# Patient Record
Sex: MALE | Race: WHITE | NOT HISPANIC OR LATINO | URBAN - METROPOLITAN AREA
[De-identification: names, ages, dates, MRNs, and addresses within clinical notes are randomized per-mention and may not be internally consistent; named-entity substitution may affect disease eponyms.]

---

## 2019-07-08 ENCOUNTER — COMMUNICATION - HEALTHEAST (OUTPATIENT)
Dept: SCHEDULING | Facility: CLINIC | Age: 69
End: 2019-07-08

## 2019-07-08 ENCOUNTER — OFFICE VISIT - HEALTHEAST (OUTPATIENT)
Dept: UROLOGY | Facility: CLINIC | Age: 69
End: 2019-07-08

## 2019-07-08 DIAGNOSIS — N13.2 HYDRONEPHROSIS WITH URINARY OBSTRUCTION DUE TO URETERAL CALCULUS: ICD-10-CM

## 2019-07-08 DIAGNOSIS — N20.0 CALCULUS OF KIDNEY: ICD-10-CM

## 2019-07-08 DIAGNOSIS — N20.1 CALCULUS OF URETER: ICD-10-CM

## 2019-07-08 LAB
ALBUMIN UR-MCNC: ABNORMAL MG/DL
APPEARANCE UR: ABNORMAL
BILIRUB UR QL STRIP: NEGATIVE
COLOR UR AUTO: YELLOW
GLUCOSE UR STRIP-MCNC: NEGATIVE MG/DL
HGB UR QL STRIP: ABNORMAL
KETONES UR STRIP-MCNC: ABNORMAL MG/DL
LEUKOCYTE ESTERASE UR QL STRIP: ABNORMAL
NITRATE UR QL: NEGATIVE
PH UR STRIP: 5.5 [PH] (ref 5–8)
SP GR UR STRIP: 1.02 (ref 1–1.03)
UROBILINOGEN UR STRIP-ACNC: ABNORMAL

## 2019-07-08 RX ORDER — ERGOCALCIFEROL (VITAMIN D2) 10 MCG
400 TABLET ORAL
Status: SHIPPED | COMMUNITY
Start: 2019-07-08

## 2019-07-08 RX ORDER — LOSARTAN POTASSIUM 25 MG/1
TABLET ORAL
Refills: 3 | Status: SHIPPED | COMMUNITY
Start: 2019-04-11

## 2019-07-08 RX ORDER — NITROGLYCERIN 0.4 MG/1
0.4 TABLET SUBLINGUAL
Status: SHIPPED | COMMUNITY
Start: 2017-07-05

## 2019-07-08 RX ORDER — CLOPIDOGREL BISULFATE 75 MG/1
75 TABLET ORAL DAILY
Refills: 3 | Status: SHIPPED | COMMUNITY
Start: 2018-12-28

## 2019-07-08 RX ORDER — ATORVASTATIN CALCIUM 80 MG/1
80 TABLET, FILM COATED ORAL
Status: SHIPPED | COMMUNITY
Start: 2017-07-05

## 2019-07-08 RX ORDER — SILDENAFIL CITRATE 20 MG/1
TABLET ORAL
Status: SHIPPED | COMMUNITY
Start: 2018-03-01

## 2019-07-10 ENCOUNTER — COMMUNICATION - HEALTHEAST (OUTPATIENT)
Dept: UROLOGY | Facility: CLINIC | Age: 69
End: 2019-07-10

## 2019-07-10 ENCOUNTER — AMBULATORY - HEALTHEAST (OUTPATIENT)
Dept: UROLOGY | Facility: CLINIC | Age: 69
End: 2019-07-10

## 2019-07-10 DIAGNOSIS — N20.1 CALCULUS OF URETER: ICD-10-CM

## 2019-07-11 ENCOUNTER — ANESTHESIA - HEALTHEAST (OUTPATIENT)
Dept: SURGERY | Facility: CLINIC | Age: 69
End: 2019-07-11

## 2019-07-12 ENCOUNTER — SURGERY - HEALTHEAST (OUTPATIENT)
Dept: SURGERY | Facility: CLINIC | Age: 69
End: 2019-07-12

## 2019-07-12 ASSESSMENT — MIFFLIN-ST. JEOR: SCORE: 1517.42

## 2019-07-13 ENCOUNTER — COMMUNICATION - HEALTHEAST (OUTPATIENT)
Dept: SCHEDULING | Facility: CLINIC | Age: 69
End: 2019-07-13

## 2019-07-15 ENCOUNTER — AMBULATORY - HEALTHEAST (OUTPATIENT)
Dept: UROLOGY | Facility: CLINIC | Age: 69
End: 2019-07-15

## 2019-07-15 DIAGNOSIS — R33.9 RETENTION OF URINE, UNSPECIFIED: ICD-10-CM

## 2019-07-15 DIAGNOSIS — N20.1 CALCULUS OF URETER: ICD-10-CM

## 2019-07-17 ENCOUNTER — COMMUNICATION - HEALTHEAST (OUTPATIENT)
Dept: SCHEDULING | Facility: CLINIC | Age: 69
End: 2019-07-17

## 2019-07-18 ENCOUNTER — COMMUNICATION - HEALTHEAST (OUTPATIENT)
Dept: SCHEDULING | Facility: CLINIC | Age: 69
End: 2019-07-18

## 2019-07-18 ENCOUNTER — AMBULATORY - HEALTHEAST (OUTPATIENT)
Dept: UROLOGY | Facility: CLINIC | Age: 69
End: 2019-07-18

## 2019-07-18 DIAGNOSIS — N20.1 CALCULUS OF URETER: ICD-10-CM

## 2019-07-22 ENCOUNTER — AMBULATORY - HEALTHEAST (OUTPATIENT)
Dept: UROLOGY | Facility: CLINIC | Age: 69
End: 2019-07-22

## 2019-07-22 DIAGNOSIS — R33.9 RETENTION OF URINE, UNSPECIFIED: ICD-10-CM

## 2019-07-22 RX ORDER — TAMSULOSIN HYDROCHLORIDE 0.4 MG/1
CAPSULE ORAL
Qty: 30 CAPSULE | Refills: 2 | Status: SHIPPED | OUTPATIENT
Start: 2019-07-22

## 2019-08-12 ENCOUNTER — OFFICE VISIT - HEALTHEAST (OUTPATIENT)
Dept: UROLOGY | Facility: CLINIC | Age: 69
End: 2019-08-12

## 2019-08-12 DIAGNOSIS — N20.1 CALCULUS OF URETER: ICD-10-CM

## 2019-08-12 DIAGNOSIS — N20.0 CALCULUS OF KIDNEY: ICD-10-CM

## 2019-08-12 LAB
ALBUMIN UR-MCNC: ABNORMAL MG/DL
APPEARANCE UR: ABNORMAL
BILIRUB UR QL STRIP: NEGATIVE
COLOR UR AUTO: YELLOW
GLUCOSE UR STRIP-MCNC: NEGATIVE MG/DL
HGB UR QL STRIP: ABNORMAL
KETONES UR STRIP-MCNC: NEGATIVE MG/DL
LEUKOCYTE ESTERASE UR QL STRIP: ABNORMAL
NITRATE UR QL: NEGATIVE
PH UR STRIP: 5.5 [PH] (ref 5–8)
SP GR UR STRIP: 1.02 (ref 1–1.03)
UROBILINOGEN UR STRIP-ACNC: ABNORMAL

## 2021-05-30 NOTE — TELEPHONE ENCOUNTER
Pt's significant other Melani reports pt has been taking Tylenol, Ibuprofen and oxycodone and still having severe pain trying to pass a kidney stone. Also having nausea.    Reviewed pain management with Melani per [facility] protocols. Advised Melani to have pt take ondansetron for nausea as he has not taken this yet. Advised Melani to encourage pt to try to relax. If pain uncontrollable should return to ER.     Melani verbalizes understanding and agrees to plan.     Reason for Disposition    [1] SEVERE pain (e.g., excruciating, scale 8-10) AND [2] not improved after pain medicine    Protocols used: FLANK PAIN-A-AH

## 2021-05-30 NOTE — ANESTHESIA CARE TRANSFER NOTE
Last vitals:   Vitals:    07/12/19 1128   BP: 112/61   Pulse: 66   Resp: 12   Temp: 36.8  C (98.3  F)   SpO2: 93%     Patient's level of consciousness is drowsy  Spontaneous respirations: yes  Maintains airway independently: yes  Dentition unchanged: yes  Oropharynx: oropharynx clear of all foreign objects    QCDR Measures:  ASA# 20 - Surgical Safety Checklist: WHO surgical safety checklist completed prior to induction    PQRS# 430 - Adult PONV Prevention: 4558F - Pt received => 2 anti-emetic agents (different classes) preop & intraop  ASA# 8 - Peds PONV Prevention: NA - Not pediatric patient, not GA or 2 or more risk factors NOT present  PQRS# 424 - Eugenia-op Temp Management: 4559F - At least one body temp DOCUMENTED => 35.5C or 95.9F within required timeframe  PQRS# 426 - PACU Transfer Protocol: - Transfer of care checklist used  ASA# 14 - Acute Post-op Pain: ASA14B - Patient did NOT experience pain >= 7 out of 10

## 2021-05-30 NOTE — PROGRESS NOTES
Patient presents to the office today for a trial of void.  He recently went into the ER for urinary rentention after stent removal and trial of void last week.  He is here to try again.    Patient's bladder was filled to 225ml of sterile water via villasenor catheter.  He has a strong urge to urinate.  Villasenor catheter was removed without difficulty.  Patient was able to urinate 75ml.  Per Dr. Sanchez, patient may leave office and report to [facility] in the early afternoon to see if he is able to have a normal urination or not.  Will determine if patient needs a villasenor catheter again.  Patient is in agreement with plan.  He will call in the afternoon.      Patient returned to office at 3:30pm.  Bladder scan was obtained for 363ml.  Patient was taught self catheterization without difficulty.  He was able to drain out 325ml.  He will cath himself 4x daily and log his progress.  Patient is in agreement with recommendations.

## 2021-05-30 NOTE — PROGRESS NOTES
Patient presents to the office today for a trial of void and cystoscopy left ureteral stent removal procedure.    Fill & pull education discussed with patient with understanding.  Mckee catheter was removed without difficulty.    Patient educated regarding stent removal procedure and possible symptoms after removal.  Patient voiced understanding of information.  Handout given to patient.  Consent form signed.    [facility] Timeout    Correct patient?: Yes  Correct site?:  Yes  Correct procedure?:  Yes  Correct laterality?:  Left  Consents verified?:  Yes  Relevant lab results available?:  Yes     200ml of sterile water was instilled into bladder via cystoscopy during stent removal.  Patient was able to void out 100ml.

## 2021-05-30 NOTE — PROGRESS NOTES
Assessment/Plan:        Diagnoses and all orders for this visit:    Retention of urine, unspecified  -     tamsulosin (FLOMAX) 0.4 mg cap; 1 tablet (0.4 mg) daily with food  Dispense: 30 capsule; Refill: 2      Stone Management Plan  [facility] Stone Management 7/15/2019 7/18/2019 7/22/2019   Urinary Tract Infection No suspicion of infection No suspicion of infection No suspicion of infection   Renal Colic Well controlled symptoms Well controlled symptoms Well controlled symptoms   Renal Failure No suspicion of renal failure No suspicion of renal failure No suspicion of renal failure   Current CT date - - -   Right sided stones? - - -   R Number of ureteral stones - - -   R Number of kidney stones  - - -   R GSD of kidney stones - - -   R Hydronephrosis - - -   R Stone Event No current event No current event No current event   R Current Plan - - -   Observe rationale - - -   Left sided stones? - - -   L Number of ureteral stones - - -   L GSD of ureteral stones - - -   L Location of ureteral stone - - -   L Number of kidney stones  - - -   L GSD of kidney stones - - -   L Hydronephrosis - - -   L Stone Event Established event Established event Resolved event   Diagnosis date - - -   Initial location of primary symptomatic stone - - -   Initial GSD of primary symptomatic stone - - -   Post-op status Unanticipated Clinic Visit Stent Removal -   L Current Plan - - -   MET - - -             Subjective:      HPI  Mr. Oliver Thomas is a 69 y.o.  male returning to the [facility] [place] for follow up of post op urinary retention. He has toleratted catheter relatively well. He has had two episodes of retention requiring ED care. He has been on Flomax in the past in an inconsistent fashion.    Today in clinic, he has equivocal response to trial of void.     He returns several hours later and is not confident that he is emptying. Bladder scan reveals ~300 cc in the bladder.    He is instructed in self  catheterization. He is given a prescription for Flomax.    He has an established relationship with a general urologist who has been looking after his prostate. Encouraged to follow with that physician.    Was give a prescription for Flomax.     ROS   Review of systems is negative except for HPI.    Past Medical History:   Diagnosis Date     Kidney stone      Myocardial infarction (H)      Ureterolithiasis        Past Surgical History:   Procedure Laterality Date     CORONARY ANGIOPLASTY WITH STENT PLACEMENT       CYSTOSCOPY W/ URETERAL STENT PLACEMENT Left 7/12/2019    Procedure: CYSTOSCOPY LEFT URETEROSCOPIC STONE EXTRACTION STENT INSERTION;  Surgeon: Brooks Sanchez MD;  Location: [facility];  Service: Urology       Current Outpatient Medications   Medication Sig Dispense Refill     aspirin 81 MG EC tablet Take 81 mg by mouth.       atorvastatin (LIPITOR) 80 MG tablet Take 80 mg by mouth.       clopidogrel (PLAVIX) 75 mg tablet Take 75 mg by mouth daily.  3     ergocalciferol, vitamin D2, 400 unit Tab Take 400 Units by mouth.       losartan (COZAAR) 25 MG tablet TK 1/2 T PO D  3     multivitamin with minerals (SUPER THERA JULIAN M) tablet Take 1 tablet by mouth.       nitroglycerin (NITROSTAT) 0.4 MG SL tablet Place 0.4 mg under the tongue.       oxyCODONE (ROXICODONE) 5 MG immediate release tablet Take 1-2 tablets (5-10 mg total) by mouth every 4 (four) hours as needed for pain. 15 tablet 0     sildenafil (REVATIO) 20 mg tablet TK 1/2 TO 1 PRN ONE HOUR BEFORE INTERCOURSE. NO MORE THAN 1 DOSE IN 24 HOURS       tamsulosin (FLOMAX) 0.4 mg cap Take 0.4 mg by mouth.       tamsulosin (FLOMAX) 0.4 mg cap 1 tablet (0.4 mg) daily with food 30 capsule 2     No current facility-administered medications for this visit.        No Known Allergies    Social History     Socioeconomic History     Marital status:      Spouse name: Not on file     Number of children: Not on file     Years of education: Not on file      Highest education level: Not on file   Occupational History     Occupation: Contractor   Social Needs     Financial resource strain: Not on file     Food insecurity:     Worry: Not on file     Inability: Not on file     Transportation needs:     Medical: Not on file     Non-medical: Not on file   Tobacco Use     Smoking status: Former Smoker     Last attempt to quit: 2017     Years since quittin.0     Smokeless tobacco: Never Used   Substance and Sexual Activity     Alcohol use: Yes     Comment: occas     Drug use: Not on file     Sexual activity: Not on file   Lifestyle     Physical activity:     Days per week: Not on file     Minutes per session: Not on file     Stress: Not on file   Relationships     Social connections:     Talks on phone: Not on file     Gets together: Not on file     Attends Sabianism service: Not on file     Active member of club or organization: Not on file     Attends meetings of clubs or organizations: Not on file     Relationship status: Not on file     Intimate partner violence:     Fear of current or ex partner: Not on file     Emotionally abused: Not on file     Physically abused: Not on file     Forced sexual activity: Not on file   Other Topics Concern     Not on file   Social History Narrative     Not on file       Family History   Problem Relation Age of Onset     Diabetes Mother      Urolithiasis Father      Urolithiasis Sister      Diabetes Sister      Heart disease Sister      Prostate cancer Brother      Diabetes Maternal Grandmother      Clotting disorder Neg Hx      Gout Neg Hx      Objective:      Physical Exam  Vitals:    19 1001   BP: (!) 163/96   Pulse: 69   Temp: 98  F (36.7  C)     General - well developed, well nourished, appropriate for age. Appears no distress at this time  Abdomen - moderately obese soft, non-tender, no hepatosplenomegaly, no masses.   - no flank tenderness, no suprapubic tenderness, kidney and bladder non-palpable  MSK - normal  spinal curvature. no spinal tenderness. normal gait. muscular strength intact.  Psych - oriented to time, place, and person, normal mood and affect.      Labs   Urinalysis POC (Office):  Nitrite, UA   Date Value Ref Range Status   07/18/2019 Negative Negative Final   07/13/2019 Negative Negative Final   07/08/2019 Negative Negative Final       Lab Urinalysis:  Blood, UA   Date Value Ref Range Status   07/18/2019 Moderate (!) Negative Final   07/13/2019 Large (!) Negative Final   07/08/2019 Trace (!) Negative Final     Nitrite, UA   Date Value Ref Range Status   07/18/2019 Negative Negative Final   07/13/2019 Negative Negative Final   07/08/2019 Negative Negative Final     Leukocytes, UA   Date Value Ref Range Status   07/18/2019 Moderate (!) Negative Final   07/13/2019 Moderate (!) Negative Final   07/08/2019 Small (!) Negative Final     pH, UA   Date Value Ref Range Status   07/18/2019 5.5 4.5 - 8.0 Final   07/13/2019 6.5 4.5 - 8.0 Final   07/08/2019 5.5 5.0 - 8.0 Final

## 2021-05-30 NOTE — TELEPHONE ENCOUNTER
"[facility] Dr Sanchez stent removal and villasenor catheter removed. He urinated while in office ~1/2= 3/4 cup 9am.   Home ~10am. He has urinated a little bit of blood clotting and a few drops. He has had a catheter x 2+1/2 days. He has been drinking fluids (6 cups).   He had urge to urinate when he tries to pass urine, but nothing is coming out.   He feels like his bladder is 3/4 the way full.  Pain=\"1-2\". His left flank pain is gone now. He took Tylenol before the procedure. He is considering taking Ibuprofen now.   He does not feel a strong urge to urinate.   He has had a couple of small bowel movements.   No fever. He has tried a warm bath already.  He has taken 2 Tamsulosin today. (spaced 4 hrs apart, last taken @ 3pm).     On call Dr Sanchez contacted @ 7:15 pm. If he does not urinate within the next 1-2 hrs, then he should be seen in the ER. Contacted patient with this information.     Madhavi Durán RN [facility] Triage Nurse  "

## 2021-05-30 NOTE — TELEPHONE ENCOUNTER
Scheduled with [facility] Dr Sanchez @ [facility] for stent removal tomorrow am 8:20am. He has a catheter since last Saturday. He has a very small amount of bleeding @ meatus and urine is pinkish--he wanted to know if this was normal? (yes). He verified time of his appointment in am as well.    Madhavi Durán RN [facility] Triage Nurse

## 2021-05-30 NOTE — PATIENT INSTRUCTIONS - HE
Patient Stated Goal: Pass my stone  Symptom Control While Passing A Stone    The goal of [facility] is to let a smaller kidney stone (less than 4 to 5 mm) pass without intervention if possible. Giving your body a chance to clear the stone may take a few hours up to a few weeks.  Keeping you well-informed, safe and fairly comfortable is important.    Drink to thirst  Do not attempt to  flush out  your stone by drinking too much fluid. This does not work and may increase nausea. Drink enough to satisfy your body s thirst. Eating your normal diet is fine.   Medications (that may be suggested or prescribed)    Ibuprofen (Advil or Motrin) Available over the counter  o Take two (200mg) tablets every six hours until the stone passes.  o Prevents spasm of the ureter.    o Decreases pain.      Dramamine* (drowsy version, non-generic formulation) Available over the counter  o Take 50mg at bedtime  o Decreases spasms of the ureter  o Decreases nausea  o Decreases acute pain  o Decreases recurrence of pain for 24 hours  o Will help you sleep  *This medication will cause increase drowsiness, do not drive or operate machinery for 6 hours.      Narcotics (Percocet, Vicodin, Dilaudid) Take as prescribed for severe pain unrelieved by ibuprofen and Dramamine  o Narcotics have significant side effects and only  cover-up  pain. They have no effect on the cause of pain.  o Common side effects  - Confusion, disorientation and sedation - DO NOT DRIVE OR OPERATE MACHINERY WITHIN 24 HOURS  - Nausea - take Dramamine or Zofran or Haldol to help control  - Constipation  - Sleep disturbances      Ondansetron (Zofran) Take as prescribed  o Reserve for severe nausea  o May cause constipation, start over the counter Miralax if needed      Second Line Anti-Nausea Medication: Adding a different anti-nausea medication maybe helpful for persistent nausea.  The combined effect of different types of anti-nausea medications maybe more  effective than either medication by itself, even in higher doses.  o Compazine: Take as prescribed      Information about kidney stones    Crystals can form if chemicals are too concentrated in your urine. If the crystal grows over time, a stone may form. A stone usually isn t painful while it is still in the kidney.    As the stone begins to leave the kidney, you may experience episodes of flank pain as the kidney stone approaches the entrance to the ureter. Some people feel a vague ache in the side.    Kidney stones may fall into the ureter. Some stones are tiny and pass through without causing symptoms. The ureter is a small tube (approximately 1/8 of an inch wide). A kidney stone can get stuck and block the ureter. If this happens, urine backs up and flows back to the kidney. Back pressure on the kidney can cause:  o Severe flank pain radiating to the groin.  o Severe nausea and vomiting.  o The pain can occur in the lower back, side, groin or all three.      When the stone reaches the lower ureter, this can irritate the bladder and sensations of feeling the urge to urinate frequently and urgently may occur.      Once the stone passes out of your ureter and into your bladder, the symptoms of urgency and frequency will often disappear. Sometimes pain will come back for a short period and will not be as severe as before. The passage of the stone from your bladder and out of your body is usually not a problem. The urethra is at least twice as wide as the normal ureter, so the stone doesn t usually block it.    Strain all urine  If you pass the stone, save it. Place it in the container we have provided and bring it to the [facility] within a week of passing it. Your stone will then be sent for analysis which takes about a month.     Signs and symptoms you might experience    Nausea    Decreased appetite    Urinary frequency    Bloody urine     Chills    Fatigue    When to call [facility] or  go to the Emergency Room    Fever with a temperature greater than 100.1    Severe pain    Persistent nausea/vomiting    If the pain worsens or nausea/vomiting is uncontrolled with medications, STOP eating & drinking. You need to have an empty stomach for 8 hours prior to surgery. Call the [facility] immediately at [phone].           Follow-up    Low dose CT scan with doctor visit 1-2 weeks after initial clinic visit per doctor s instructions    Please cancel the CT scan visit if you pass a stone. Reschedule for a one month follow-up with doctor to discuss stone composition and future prevention.    Preventing future stones    Approximately a month after your stone is sent out for analysis, a prevention visit will occur with your provider, to discuss an individualized plan for prevention of new stones and to discuss managing stones that you may still have. Along with the analysis of the kidney stone, blood and urine tests may be indicated to develop this plan. Knowing the type of kidney stones you make, and why, allows the providers at the [facility] to recommend specific ways to prevent them.    Follow-up visits are an important part of monitoring and preventing future re-occurrences.    The [facility] is available for questions or concerns 24 hours a day at [phone]

## 2021-05-30 NOTE — ANESTHESIA POSTPROCEDURE EVALUATION
Patient: Oliver Thomas  CYSTOSCOPY LEFT URETEROSCOPIC STONE EXTRACTION STENT INSERTION  Anesthesia type: general    Patient location: PACU  Last vitals:   Vitals Value Taken Time   /63 7/12/2019 12:00 PM   Temp 36.8  C (98.3  F) 7/12/2019 11:28 AM   Pulse 61 7/12/2019 12:15 PM   Resp 14 7/12/2019 12:13 PM   SpO2 97 % 7/12/2019 12:15 PM   Vitals shown include unvalidated device data.  Post vital signs: stable  Level of consciousness: awake and responds to simple questions  Post-anesthesia pain: pain controlled  Post-anesthesia nausea and vomiting: no  Pulmonary: unassisted, return to baseline  Cardiovascular: stable and blood pressure at baseline  Hydration: adequate  Anesthetic events: no    QCDR Measures:  ASA# 11 - Eugenia-op Cardiac Arrest: ASA11B - Patient did NOT experience unanticipated cardiac arrest  ASA# 12 - Eugenia-op Mortality Rate: ASA12B - Patient did NOT die  ASA# 13 - PACU Re-Intubation Rate: ASA13B - Patient did NOT require a new airway mgmt  ASA# 10 - Composite Anes Safety: ASA10A - No serious adverse event    Additional Notes:

## 2021-05-30 NOTE — PROGRESS NOTES
Assessment/Plan:        Diagnoses and all orders for this visit:    Calculus of ureter  -     Cystoscopy with Stent Removal Education  -     Patient Stated Goal: Prevent further stones  -     lidocaine HCl 2 % topical jelly 10 mL (UROJET)    Other orders  -     lidocaine HCl (UROJET) 2 % topical jelly      Stone Management Plan  [facility] Stone Management 7/9/2019 7/15/2019 7/18/2019   Urinary Tract Infection No suspicion of infection No suspicion of infection No suspicion of infection   Renal Colic Asymptomatic at this time Well controlled symptoms Well controlled symptoms   Renal Failure No suspicion of renal failure No suspicion of renal failure No suspicion of renal failure   Current CT date 7/8/2019 - -   Right sided stones? Yes - -   R Number of ureteral stones No ureteral stones - -   R Number of kidney stones  1 - -   R GSD of kidney stones < 2 - -   R Hydronephrosis None - -   R Stone Event No current event No current event No current event   R Current Plan Observe - -   Observe rationale Limited stone burden with good prognosis for spontaneous passage - -   Left sided stones? Yes - -   L Number of ureteral stones 1 - -   L GSD of ureteral stones 9 - -   L Location of ureteral stone Distal - -   L Number of kidney stones  No renal stones - -   L GSD of kidney stones N/A - -   L Hydronephrosis Mild - -   L Stone Event New event Established event Established event   Diagnosis date 7/8/2019 - -   Initial location of primary symptomatic stone Distal - -   Initial GSD of primary symptomatic stone 9 - -   Post-op status - Unanticipated Clinic Visit Stent Removal   L Current Plan MET - -   MET 1 week F/U - -             Subjective:      HPI  Mr. Oliver Thomas is a 69 y.o.  male returning to the [facility] [place] for early postoperative follow up for anticipated stent removal.     He returns status post left ureteroscopic extraction for distal ureteral stone. He has had to visit the Emergency  Department for urinary retention.    He has had no symptoms suspicious for infection and stent was mildly symptomatic with typical issues of flank discomfort and lower urinary tract irritation.     Flexible cystoscopy is performed and indwelling stent is removed without incident.    He will follow up in the office in one month without imaging.    Voided well after filling the bladder.     ROS   Review of systems is negative except for HPI.    Past Medical History:   Diagnosis Date     Kidney stone      Myocardial infarction (H)      Ureterolithiasis        Past Surgical History:   Procedure Laterality Date     CORONARY ANGIOPLASTY WITH STENT PLACEMENT       CYSTOSCOPY W/ URETERAL STENT PLACEMENT Left 7/12/2019    Procedure: CYSTOSCOPY LEFT URETEROSCOPIC STONE EXTRACTION STENT INSERTION;  Surgeon: Brooks Sanchez MD;  Location: [facility];  Service: Urology       Current Outpatient Medications   Medication Sig Dispense Refill     aspirin 81 MG EC tablet Take 81 mg by mouth.       atorvastatin (LIPITOR) 80 MG tablet Take 80 mg by mouth.       cefuroxime (CEFTIN) 500 MG tablet Take 1 tablet (500 mg total) by mouth 2 (two) times a day for 10 days. 20 tablet 0     clopidogrel (PLAVIX) 75 mg tablet Take 75 mg by mouth daily.  3     ergocalciferol, vitamin D2, 400 unit Tab Take 400 Units by mouth.       losartan (COZAAR) 25 MG tablet TK 1/2 T PO D  3     metroNIDAZOLE (FLAGYL) 500 MG tablet Take 1 tablet (500 mg total) by mouth 2 (two) times a day for 10 days. 20 tablet 0     multivitamin with minerals (SUPER THERA JULIAN M) tablet Take 1 tablet by mouth.       nitroglycerin (NITROSTAT) 0.4 MG SL tablet Place 0.4 mg under the tongue.       oxyCODONE (ROXICODONE) 5 MG immediate release tablet Take 1-2 tablets (5-10 mg total) by mouth every 4 (four) hours as needed for pain. 15 tablet 0     sildenafil (REVATIO) 20 mg tablet TK 1/2 TO 1 PRN ONE HOUR BEFORE INTERCOURSE. NO MORE THAN 1 DOSE IN 24 HOURS        tamsulosin (FLOMAX) 0.4 mg cap Take 0.4 mg by mouth.       Current Facility-Administered Medications   Medication Dose Route Frequency Provider Last Rate Last Dose     lidocaine HCl (UROJET) 2 % topical jelly                No Known Allergies    Social History     Socioeconomic History     Marital status:      Spouse name: Not on file     Number of children: Not on file     Years of education: Not on file     Highest education level: Not on file   Occupational History     Occupation: Contractor   Social Needs     Financial resource strain: Not on file     Food insecurity:     Worry: Not on file     Inability: Not on file     Transportation needs:     Medical: Not on file     Non-medical: Not on file   Tobacco Use     Smoking status: Former Smoker     Last attempt to quit: 2017     Years since quittin.0     Smokeless tobacco: Never Used   Substance and Sexual Activity     Alcohol use: Yes     Comment: occas     Drug use: Not on file     Sexual activity: Not on file   Lifestyle     Physical activity:     Days per week: Not on file     Minutes per session: Not on file     Stress: Not on file   Relationships     Social connections:     Talks on phone: Not on file     Gets together: Not on file     Attends Jain service: Not on file     Active member of club or organization: Not on file     Attends meetings of clubs or organizations: Not on file     Relationship status: Not on file     Intimate partner violence:     Fear of current or ex partner: Not on file     Emotionally abused: Not on file     Physically abused: Not on file     Forced sexual activity: Not on file   Other Topics Concern     Not on file   Social History Narrative     Not on file       Family History   Problem Relation Age of Onset     Diabetes Mother      Urolithiasis Father      Urolithiasis Sister      Diabetes Sister      Heart disease Sister      Prostate cancer Brother      Diabetes Maternal Grandmother      Clotting disorder  Neg Hx      Gout Neg Hx      Objective:      Physical Exam  Vitals:    07/18/19 0816   BP: 157/81   Pulse: 72   Temp: 97.6  F (36.4  C)     General - well developed, well nourished, appropriate for age. Appears no distress at this time  Abdomen - mildly obese soft, non-tender, no hepatosplenomegaly, no masses.   - no flank tenderness, no suprapubic tenderness, kidney and bladder non-palpable  MSK - normal spinal curvature. no spinal tenderness. normal gait. muscular strength intact.  Psych - oriented to time, place, and person, normal mood and affect.      Labs   Urinalysis POC (Office):  Nitrite, UA   Date Value Ref Range Status   07/13/2019 Negative Negative Final   07/08/2019 Negative Negative Final   07/08/2019 Negative Negative Final       Lab Urinalysis:  Blood, UA   Date Value Ref Range Status   07/13/2019 Large (!) Negative Final   07/08/2019 Trace (!) Negative Final   07/08/2019 Moderate (!) Negative Final     Nitrite, UA   Date Value Ref Range Status   07/13/2019 Negative Negative Final   07/08/2019 Negative Negative Final   07/08/2019 Negative Negative Final     Leukocytes, UA   Date Value Ref Range Status   07/13/2019 Moderate (!) Negative Final   07/08/2019 Small (!) Negative Final   07/08/2019 Large (!) Negative Final     pH, UA   Date Value Ref Range Status   07/13/2019 6.5 4.5 - 8.0 Final   07/08/2019 5.5 5.0 - 8.0 Final   07/08/2019 5.5 4.5 - 8.0 Final    and Acute Labs   Urine Culture    Culture   Date Value Ref Range Status   07/13/2019 No Growth  Final   07/08/2019 No Growth  Final

## 2021-05-30 NOTE — PROGRESS NOTES
Assessment/Plan:        Diagnoses and all orders for this visit:    Retention of urine, unspecified    Calculus of ureter        Stone Management Plan  [facility] Stone Management 7/9/2019 7/15/2019   Urinary Tract Infection No suspicion of infection No suspicion of infection   Renal Colic Asymptomatic at this time Well controlled symptoms   Renal Failure No suspicion of renal failure No suspicion of renal failure   Current CT date 7/8/2019 -   Right sided stones? Yes -   R Number of ureteral stones No ureteral stones -   R Number of kidney stones  1 -   R GSD of kidney stones < 2 -   R Hydronephrosis None -   R Stone Event No current event No current event   R Current Plan Observe -   Observe rationale Limited stone burden with good prognosis for spontaneous passage -   Left sided stones? Yes -   L Number of ureteral stones 1 -   L GSD of ureteral stones 9 -   L Location of ureteral stone Distal -   L Number of kidney stones  No renal stones -   L GSD of kidney stones N/A -   L Hydronephrosis Mild -   L Stone Event New event Established event   Diagnosis date 7/8/2019 -   Initial location of primary symptomatic stone Distal -   Initial GSD of primary symptomatic stone 9 -   Post-op status - Unanticipated Clinic Visit   L Current Plan MET -   MET 1 week F/U -             Subjective:      HPI  Mr. Oliver Thomas is a 69 y.o.  male returning to the [facility] [place] for unanticipated care status post eft ureteroscopic stone extraction.    HE went into retention following URS and went to ED for cath insertion.    We brought him in to office today for trial of void but he has since changed his mind and would prefer to wait until his stent is removed later this week.    No intervention today. Tolerating catheter well.     ROS   Review of systems is negative except for HPI.    Past Medical History:   Diagnosis Date     Kidney stone      Myocardial infarction (H)      Ureterolithiasis        Past Surgical  History:   Procedure Laterality Date     CORONARY ANGIOPLASTY WITH STENT PLACEMENT       CYSTOSCOPY W/ URETERAL STENT PLACEMENT Left 7/12/2019    Procedure: CYSTOSCOPY LEFT URETEROSCOPIC STONE EXTRACTION STENT INSERTION;  Surgeon: Brooks Sanchez MD;  Location: [facility];  Service: Urology       Current Outpatient Medications   Medication Sig Dispense Refill     acetaminophen (TYLENOL) 500 MG tablet Take 2 tablets (1,000 mg total) by mouth 4 (four) times a day for 7 days. 56 tablet 0     aspirin 81 MG EC tablet Take 81 mg by mouth.       atorvastatin (LIPITOR) 80 MG tablet Take 80 mg by mouth.       cefuroxime (CEFTIN) 500 MG tablet Take 1 tablet (500 mg total) by mouth 2 (two) times a day for 10 days. 20 tablet 0     clopidogrel (PLAVIX) 75 mg tablet Take 75 mg by mouth daily.  3     dimenhyDRINATE (DRAMAMINE) 50 MG tablet Take 1 tablet (50 mg total) by mouth at bedtime for 7 days. 7 tablet 0     dimenhyDRINATE (DRAMAMINE) 50 MG tablet Take 1 tablet (50 mg total) by mouth 4 (four) times a day as needed. 28 tablet 0     ergocalciferol, vitamin D2, 400 unit Tab Take 400 Units by mouth.       ibuprofen (ADVIL,MOTRIN) 200 MG tablet Take 2 tablets (400 mg total) by mouth 4 (four) times a day for 7 days. 56 tablet 0     metroNIDAZOLE (FLAGYL) 500 MG tablet Take 1 tablet (500 mg total) by mouth 2 (two) times a day for 10 days. 20 tablet 0     multivitamin with minerals (SUPER THERA JULIAN M) tablet Take 1 tablet by mouth.       nitroglycerin (NITROSTAT) 0.4 MG SL tablet Place 0.4 mg under the tongue.       oxyCODONE (ROXICODONE) 5 MG immediate release tablet Take 1-2 tablets (5-10 mg total) by mouth every 4 (four) hours as needed for pain. 15 tablet 0     sildenafil (REVATIO) 20 mg tablet TK 1/2 TO 1 PRN ONE HOUR BEFORE INTERCOURSE. NO MORE THAN 1 DOSE IN 24 HOURS       tamsulosin (FLOMAX) 0.4 mg cap Take 0.4 mg by mouth.       losartan (COZAAR) 25 MG tablet TK 1/2 T PO D  3     No current facility-administered  medications for this visit.        No Known Allergies    Social History     Socioeconomic History     Marital status:      Spouse name: Not on file     Number of children: Not on file     Years of education: Not on file     Highest education level: Not on file   Occupational History     Occupation: Contractor   Social Needs     Financial resource strain: Not on file     Food insecurity:     Worry: Not on file     Inability: Not on file     Transportation needs:     Medical: Not on file     Non-medical: Not on file   Tobacco Use     Smoking status: Former Smoker     Last attempt to quit: 2017     Years since quittin.0     Smokeless tobacco: Never Used   Substance and Sexual Activity     Alcohol use: Yes     Comment: occas     Drug use: Not on file     Sexual activity: Not on file   Lifestyle     Physical activity:     Days per week: Not on file     Minutes per session: Not on file     Stress: Not on file   Relationships     Social connections:     Talks on phone: Not on file     Gets together: Not on file     Attends Hindu service: Not on file     Active member of club or organization: Not on file     Attends meetings of clubs or organizations: Not on file     Relationship status: Not on file     Intimate partner violence:     Fear of current or ex partner: Not on file     Emotionally abused: Not on file     Physically abused: Not on file     Forced sexual activity: Not on file   Other Topics Concern     Not on file   Social History Narrative     Not on file       Family History   Problem Relation Age of Onset     Diabetes Mother      Urolithiasis Father      Urolithiasis Sister      Diabetes Sister      Heart disease Sister      Prostate cancer Brother      Diabetes Maternal Grandmother      Clotting disorder Neg Hx      Gout Neg Hx      Objective:      Physical Exam  Vitals:    07/15/19 1318   BP: 143/80   Pulse: 70   Temp: 97.5  F (36.4  C)     General - well developed, well nourished,  appropriate for age. Appears no distress at this time  Abdomen - moderately obese soft, non-tender, no hepatosplenomegaly, no masses.   - no flank tenderness, no suprapubic tenderness, kidney and bladder non-palpable  MSK - normal spinal curvature. no spinal tenderness. normal gait. muscular strength intact.  Psych - oriented to time, place, and person, normal mood and affect.      Labs   Urinalysis POC (Office):  Nitrite, UA   Date Value Ref Range Status   07/13/2019 Negative Negative Final   07/08/2019 Negative Negative Final   07/08/2019 Negative Negative Final       Lab Urinalysis:  Blood, UA   Date Value Ref Range Status   07/13/2019 Large (!) Negative Final   07/08/2019 Trace (!) Negative Final   07/08/2019 Moderate (!) Negative Final     Nitrite, UA   Date Value Ref Range Status   07/13/2019 Negative Negative Final   07/08/2019 Negative Negative Final   07/08/2019 Negative Negative Final     Leukocytes, UA   Date Value Ref Range Status   07/13/2019 Moderate (!) Negative Final   07/08/2019 Small (!) Negative Final   07/08/2019 Large (!) Negative Final     pH, UA   Date Value Ref Range Status   07/13/2019 6.5 4.5 - 8.0 Final   07/08/2019 5.5 5.0 - 8.0 Final   07/08/2019 5.5 4.5 - 8.0 Final    and Acute Labs   Urine Culture    Culture   Date Value Ref Range Status   07/13/2019 No Growth  Final   07/08/2019 No Growth  Final

## 2021-05-30 NOTE — PROGRESS NOTES
Patient presents to the office today for a fill and pull catheter removal procedure.  He also has a ureteral stent in that is to be removed this coming Thursday.      After educating patient on trial of void procedure.  Patient would like to keep his villasenor catheter in until Thursday stent removal to allow more time for recovery and do both trial of void and stent removal at the same time.

## 2021-05-30 NOTE — PROGRESS NOTES
Assessment/Plan:        Diagnoses and all orders for this visit:    Calculus of ureter  -     Urinalysis Macroscopic  -     Symptom Control While Passing a Stone Education  -     CT Abdomen Pelvis Without Oral Without IV Contrast; Future; Expected date: 07/22/2019  -     Patient Stated Goal: Pass my stone    Hydronephrosis with urinary obstruction due to ureteral calculus    Calculus of kidney    Other orders  -     aspirin 81 MG EC tablet; Take 81 mg by mouth.  -     clopidogrel (PLAVIX) 75 mg tablet; Take 75 mg by mouth daily.; Refill: 3  -     atorvastatin (LIPITOR) 80 MG tablet; Take 80 mg by mouth.  -     tamsulosin (FLOMAX) 0.4 mg cap; Take 0.4 mg by mouth.  -     sildenafil (REVATIO) 20 mg tablet; TK 1/2 TO 1 PRN ONE HOUR BEFORE INTERCOURSE. NO MORE THAN 1 DOSE IN 24 HOURS  -     nitroglycerin (NITROSTAT) 0.4 MG SL tablet; Place 0.4 mg under the tongue.  -     multivitamin with minerals (SUPER THERA JULIAN M) tablet; Take 1 tablet by mouth.  -     losartan (COZAAR) 25 MG tablet; TK 1/2 T PO D; Refill: 3  -     ergocalciferol, vitamin D2, 400 unit Tab; Take 400 Units by mouth.      Stone Management Plan  [facility] Stone Management 7/9/2019   Urinary Tract Infection No suspicion of infection   Renal Colic Asymptomatic at this time   Renal Failure No suspicion of renal failure   Current CT date 7/8/2019   Right sided stones? Yes   R Number of ureteral stones No ureteral stones   R Number of kidney stones  1   R GSD of kidney stones < 2   R Hydronephrosis None   R Stone Event No current event   R Current Plan Observe   Observe rationale Limited stone burden with good prognosis for spontaneous passage   Left sided stones? Yes   L Number of ureteral stones 1   L GSD of ureteral stones 9   L Location of ureteral stone Distal   L Number of kidney stones  No renal stones   L GSD of kidney stones N/A   L Hydronephrosis Mild   L Stone Event New event   Diagnosis date 7/8/2019   Initial location of primary symptomatic stone  Distal   Initial GSD of primary symptomatic stone 9   L Current Plan MET   MET 1 week F/U           Subjective:      HPI  Mr. Oliver Thomas is a 69 y.o.  male presenting to the [facility] [place] following [place]'s ER visit for diverticulitis and diagnosis of urolithiasis.    He is a remotely recurrent unidentified composition stone former who has not required stone clearance procedures. He has had 3 stone events in past 25 years, last occurrence ~ 7 years ago. He has been followed by Dr. Drummond through [facility]. He has not previously participated in stone risk evaluation. He has no identified modifiable stone risk factors. He has identified non-modifiable stone risks including:  limited family history.    He was seen in ER early this morning for acute onset left lower abdominal pain x few hours. The pain was constant but fluctuated in severity, reaching 9/10 at its worst. He took ibuprofen and tylenol but vomited shortly after. Workup was notable for labs demonstrating renal insufficiency, mild leukocytosis with no acute concern of urinary infection. CT reported an obstructing left UVJ stone and acute diverticulitis with adjacent mesenteric nodes. He was sent home on ceftin and flagyl. In addition, he has oxycodone and zofran.    He had mild left abdominal pain earlier today but currently asymptomatic. He denies symptoms of fever, chills, flank pain, nausea, vomiting, urinary frequency and dysuria. No acute change in bowel movements.     CT scan is personally reviewed and demonstrates an obstructing 9 x 4 mm left UVJ stone. There is a < 2 mm right upper pole renal stone.    Significant labs from presentation include moderate hematuria, moderate pyuria, negative nitrite, moderate bacteria, pending results on urine culture, elevated WBC, slightly elevated creatinine and normal potassium.    PLAN    68 yo M with hx of remote stone disease, no previous surgical stone interventions. Newly  diagnosed obstructing left UVJ stone and diverticulitis, currently asymptomatic. Tiny right renal stone.    Will proceed with medical expulsive therapy. Risks and benefits were detailed of medical expulsive therapy including probability of stone passage, recurrent renal colic, and requirement of emergency medical and/or surgical care and further imaging. Patient verbalized understanding. Patient agrees with plan as discussed. He will return in 1 week with low dose CT scan.    For symptom control, he was prescribed oxycodone and ondansetron. Over the counter symptom control medications of Dramamine and Tylenol were recommended. He should finish both flagyl and ceftin.    Patient also seen and examined by MELINA Jackson   Review of Systems  A 12 point comprehensive review of systems is negative except for HPI    Past Medical History:   Diagnosis Date     Kidney stone      Myocardial infarction (H)        Past Surgical History:   Procedure Laterality Date     CORONARY ANGIOPLASTY WITH STENT PLACEMENT         Current Outpatient Medications   Medication Sig Dispense Refill     acetaminophen (TYLENOL) 500 MG tablet Take 2 tablets (1,000 mg total) by mouth 4 (four) times a day for 7 days. 56 tablet 0     aspirin 81 MG EC tablet Take 81 mg by mouth.       atorvastatin (LIPITOR) 80 MG tablet Take 80 mg by mouth.       cefuroxime (CEFTIN) 500 MG tablet Take 1 tablet (500 mg total) by mouth 2 (two) times a day for 10 days. 20 tablet 0     clopidogrel (PLAVIX) 75 mg tablet Take 75 mg by mouth daily.  3     ergocalciferol, vitamin D2, 400 unit Tab Take 400 Units by mouth.       ibuprofen (ADVIL,MOTRIN) 200 MG tablet Take 2 tablets (400 mg total) by mouth 4 (four) times a day for 7 days. 56 tablet 0     losartan (COZAAR) 25 MG tablet TK 1/2 T PO D  3     metroNIDAZOLE (FLAGYL) 500 MG tablet Take 1 tablet (500 mg total) by mouth 2 (two) times a day for 10 days. 20 tablet 0     multivitamin with minerals (SUPER THERA  JULIAN M) tablet Take 1 tablet by mouth.       ondansetron (ZOFRAN ODT) 4 MG disintegrating tablet Take 1 tablet (4 mg total) by mouth every 8 (eight) hours as needed for nausea. 12 tablet 0     oxyCODONE (ROXICODONE) 5 MG immediate release tablet Every 4-6 hours as needed if pain is not improved with acetaminophen and ibuprofen. 12 tablet 0     sildenafil (REVATIO) 20 mg tablet TK 1/2 TO 1 PRN ONE HOUR BEFORE INTERCOURSE. NO MORE THAN 1 DOSE IN 24 HOURS       tamsulosin (FLOMAX) 0.4 mg cap Take 0.4 mg by mouth.       dimenhyDRINATE (DRAMAMINE) 50 MG tablet Take 1 tablet (50 mg total) by mouth at bedtime for 7 days. 7 tablet 0     dimenhyDRINATE (DRAMAMINE) 50 MG tablet Take 1 tablet (50 mg total) by mouth 4 (four) times a day as needed. 28 tablet 0     nitroglycerin (NITROSTAT) 0.4 MG SL tablet Place 0.4 mg under the tongue.       No current facility-administered medications for this visit.        No Known Allergies    Social History     Socioeconomic History     Marital status:      Spouse name: Not on file     Number of children: Not on file     Years of education: Not on file     Highest education level: Not on file   Occupational History     Occupation: Contractor   Social Needs     Financial resource strain: Not on file     Food insecurity:     Worry: Not on file     Inability: Not on file     Transportation needs:     Medical: Not on file     Non-medical: Not on file   Tobacco Use     Smoking status: Former Smoker     Smokeless tobacco: Never Used   Substance and Sexual Activity     Alcohol use: Yes     Comment: occas     Drug use: Not on file     Sexual activity: Not on file   Lifestyle     Physical activity:     Days per week: Not on file     Minutes per session: Not on file     Stress: Not on file   Relationships     Social connections:     Talks on phone: Not on file     Gets together: Not on file     Attends Muslim service: Not on file     Active member of club or organization: Not on file      Attends meetings of clubs or organizations: Not on file     Relationship status: Not on file     Intimate partner violence:     Fear of current or ex partner: Not on file     Emotionally abused: Not on file     Physically abused: Not on file     Forced sexual activity: Not on file   Other Topics Concern     Not on file   Social History Narrative     Not on file       Family History   Problem Relation Age of Onset     Diabetes Mother      Urolithiasis Father      Urolithiasis Sister      Diabetes Sister      Heart disease Sister      Prostate cancer Brother      Diabetes Maternal Grandmother      Clotting disorder Neg Hx      Gout Neg Hx        Objective:      Physical Exam  Vitals:    07/08/19 1520   BP: 131/78   Pulse: 69   Temp: 97.5  F (36.4  C)     General - well developed, well nourished, appropriate for age. Appears no distress at this time   Heart - regular rate and rhythm, no murmur  Respiratory - normal effort, clear to auscultation, good air entry without adventitious noises  Abdomen - soft, non-tender, no hepatosplenomegaly, no masses.   - no flank tenderness, no suprapubic tenderness, kidney and bladder non-palpable  MSK - normal spinal curvature. no spinal tenderness. normal gait. muscular strength intact.  Neurology - cranial nerves II-XII grossly intact, normal sensation, no unsteadiness  Skin - intact, no bruising, no gouty tophi  Psych - oriented to time, place, and person, normal mood and affect.    Labs  Urinalysis POC (Office):  Nitrite, UA   Date Value Ref Range Status   07/08/2019 Negative Negative Final   07/08/2019 Negative Negative Final       Lab Urinalysis:  Blood, UA   Date Value Ref Range Status   07/08/2019 Trace (!) Negative Final   07/08/2019 Moderate (!) Negative Final     Nitrite, UA   Date Value Ref Range Status   07/08/2019 Negative Negative Final   07/08/2019 Negative Negative Final     Leukocytes, UA   Date Value Ref Range Status   07/08/2019 Small (!) Negative Final    07/08/2019 Large (!) Negative Final     pH, UA   Date Value Ref Range Status   07/08/2019 5.5 5.0 - 8.0 Final   07/08/2019 5.5 4.5 - 8.0 Final    and Acute Labs   CBC   WBC   Date Value Ref Range Status   07/08/2019 12.1 (H) 4.0 - 11.0 thou/uL Final     Hemoglobin   Date Value Ref Range Status   07/08/2019 14.7 14.0 - 18.0 g/dL Final     Platelets   Date Value Ref Range Status   07/08/2019 261 140 - 440 thou/uL Final   , Renal Panel  [facility]  Creatinine   Date Value Ref Range Status   07/08/2019 1.58 (H) 0.70 - 1.30 mg/dL Final     Potassium   Date Value Ref Range Status   07/08/2019 4.2 3.5 - 5.0 mmol/L Final     Calcium   Date Value Ref Range Status   07/08/2019 9.6 8.5 - 10.5 mg/dL Final    and Urine Culture    Culture   Date Value Ref Range Status   07/08/2019 No Growth  Final

## 2021-05-30 NOTE — TELEPHONE ENCOUNTER
Patient called stating he continues to have left abdominal pain, despite taking all [facility] pain medications, and requiring oxycodone every 4 hours.     Will schedule for left URS 7/12/19 given failed trial of passage with persistent renal colic.    Patient understands and agrees with plan    Additional # 15 oxycodone sent to pharmacy

## 2021-05-30 NOTE — TELEPHONE ENCOUNTER
"Surgery today for kidney stone, released from hospital at 2pm     Surgeon Role   Brooks Sanchez MD Primary    Procedure Laterality Anesthesia   CYSTOSCOPY LEFT URETEROSCOPIC STONE EXTRACTION STENT INSERTION Left General          Patient states that since he has been home he has had a constant pain level of 6/10. Can not get the pain level to decrease even with takingall medications as prescribed and recommended.     Patient seems to audibly be in significant pain. When asked where his pain was located he became frustrated and states \"It's draining out so slow and my stomach feels like a rock!\"    Patient states that when he first came home he was passing tissue and had somewhat of a stream, but now he is only able to urinate little drips with blood coming out.     Patient states that his bladder is where it is firm.     Patient also complains of left sided pain, likely due to surgery and stent.     Triaged to a disposition of Go to ED Now. Patient is agreeable. Wife will drive him to ED Now.     Reason for Disposition    [1] Unable to urinate (or only a few drops) > 4 hours AND     [2] bladder feels very full (e.g., palpable bladder or strong urge to urinate)    Protocols used: URINARY SYMPTOMS-NEFTALY-EUGENIO Francis RN Triage Nurse Advisor [facility]    "

## 2021-05-30 NOTE — PATIENT INSTRUCTIONS - HE
Patient Stated Goal: Prevent further stones  Cystoscopy with Stent Removal    Cystoscopy is used to help diagnose urinary problems, or to remove a ureteral stent.    During a cystoscopy, your doctor examines the inside of your bladder with an instrument called a cystoscope. A cystoscope is a long, thin flexible tube with a camera at the end.    Your doctor will insert the scope into your urethra allowing him to visualize and evaluate the inside of the bladder for possible abnormalities. The urethra is the tube that carries urine to the outside of your body.    How is the stent removed?    Your stent will be removed in the [facility] with a small telescope and a grasping tool.  It usually takes less than 1 minute to remove the stent.    What should I expect after the stent is removed?     You should feel normal by the next day.    Some patients find:      An increase in back pain about an hour after the stent is removed as the kidney fills up with urine before it starts to empty.  It can be as uncomfortable as your initial stone episode.  Taking pain medications before stent removal may be helpful, but you would need someone else to drive you to and from your appointment.    Bladder symptoms usually disappear by the next morning.    Small amounts of blood in the urine may be seen occasionally for up to a week.    At Home:      It is important to drink plenty of fluids after your procedure    You may continue to use your pain medications as prescribed    What symptoms should I watch for?    Fever     Chills    Increasing back pain that is not relieved with pain medications    Large amounts of blood in the urine or large clots    Leakage of urine (incontinence)     Are not able to urinate for 8 hours    These symptoms may mean you have a blockage or infection. Call the [facility] 24 hours a day at [phone] immediately.

## 2021-05-31 ENCOUNTER — RECORDS - HEALTHEAST (OUTPATIENT)
Dept: ADMINISTRATIVE | Facility: CLINIC | Age: 71
End: 2021-05-31

## 2021-05-31 NOTE — PATIENT INSTRUCTIONS - HE
Patient Stated Goal: Prevent further stones  Calcium Oxalate Stone Prevention Self Management    Drink more fluids:    Drinking more liquids is the best way you can help prevent future stones. Stones can form when substances in the urine are too concentrated. The more you drink, the more urine you will make. This means all substances in the urine will be less concentrated.    How much urine should I be producing?    The usual recommended daily urine production is about 2 to 3 quarts (8574-5922 ml). If you are producing more than 3 quarts of urine on a regular basis, it is possible to deplete important minerals stored in the body.    To measure the amount of urine you produce in a day, you can either:    Collect all urine in a container and measure at the end of the day     Use a measuring cup each time you urinate and add up the amounts at the end of the day     Observe    Color - Dark marva urine is concentrated. Light straw color or lighter is dilute and desirable     Odor - Concentrated urine tends to smell stronger. Dilute urine is nearly odorless    Ways to increase your fluid intake    Increasing the amount of fluid you drink is effective for all types of kidney stones. While water is commonly recommended, all fluids are effective for increasing the amount of urine your body produces.    Focus on starting a lifelong habit, rather than a short-term solution.     Keep liquids on hand that you like. Crystal Light is a low calorie appropriate choice.    Drink out of larger glasses. You'll tend to drink more with each serving.     Have an additional glass of fluid with each meal.     Keep a water or drink bottle at work and fill it regularly.     *If you are prone to fluid retention, consult your doctor before making changes to your fluid habits.    Low Oxalate Diet:    Avoid excess amounts or daily consumption of these foods:    All nuts and nut products including peanuts, almonds, pecans, peanut butter, almond  milk    Rhubarb    Chocolate    Soybeans and soy products     Spinach    Wheat Germ    Beets    Maintain a normal calcium diet:    Researches have found that people with low calcium intakes tend to have more stones. Foods with high calcium content are acceptable and include:    Dairy products (including milk, cheese and yogurt)    Meat and fish    Enriched cereals    Dark green vegetables    What about calcium supplements?     Many people take calcium supplements, either on their own or as prescribed by a doctor. Research has indicated that calcium supplements do not usually pose a risk for stone formation.  Calcium citrate is a better choice for a supplement.    Avoid excess salt:    Salt (sodium chloride) is found in abundance in many foods. High sodium levels in the urine can interfere with the kidney's handling of calcium.     Tips for reducing the salt in your diet:    Don't use salt at the table    Reduce the salt used in food preparation. Try 1/2 teaspoon when recipes call for 1 teaspoon.    Use herbs and spices for flavoring instead of salt.    Avoid salty foods.    Check the label before you buy or use a product. Note sodium and portion size information.    Try to consume less than 2,000 mg/day. (1 teaspoon = 2,000 mg)    Foods with high sodium content include:    Processed meat (including luncheon meats, sausage)     Crackers     Instant cereal     Processed cheese     Canned soups     Chips and snack foods     Soy sauce    The [facility] can respond to your questions or concerns 24 hours a day at [phone].

## 2021-05-31 NOTE — PROGRESS NOTES
Assessment/Plan:        Diagnoses and all orders for this visit:    Calculus of ureter  -     Urinalysis Macroscopic    Calculus of kidney  -     Patient Stated Goal: Prevent further stones  -     Calcium Oxalate Stone Prevention Education      Stone Management Plan  [facility] 7/18/2019 7/22/2019 8/12/2019   Urinary Tract Infection No suspicion of infection No suspicion of infection No suspicion of infection   Renal Colic Well controlled symptoms Well controlled symptoms Asymptomatic at this time   Renal Failure No suspicion of renal failure No suspicion of renal failure No suspicion of renal failure   Current CT date - - -   Right sided stones? - - -   R Number of ureteral stones - - -   R Number of kidney stones  - - -   R GSD of kidney stones - - -   R Hydronephrosis - - -   R Stone Event No current event No current event No current event   R Current Plan - - Observe   Observe rationale - - Limited stone burden with good prognosis for spontaneous passage   Left sided stones? - - -   L Number of ureteral stones - - -   L GSD of ureteral stones - - -   L Location of ureteral stone - - -   L Number of kidney stones  - - -   L GSD of kidney stones - - -   L Hydronephrosis - - -   L Stone Event Established event Resolved event Resolved event   Diagnosis date - - -   Initial location of primary symptomatic stone - - -   Initial GSD of primary symptomatic stone - - -   Resolved date - 7/23/2019 8/12/2019   Post-op status Stent Removal No imaging No imaging   L Current Plan - - -   MET - - -         Subjective:      HPI  Mr. Oliver Thomas is a 69 y.o.  male returning to the [facility] [place] for late postoperative follow-up.     He returns status post Left ureteroscopic extraction for distal ureteral stone. Prior to his stent extraction, he had to visit the Emergency Department for urinary retention.     He is asymptomatic at present from a stone perspective. He followed up with his  established Urologist last week for his chronic voiding issues. He instructed to cease intermittent cath with observation. He had to cath 4 x within last 2 days. He denies symptoms of fever, chills, flank pain, nausea, vomiting, urinary frequency and dysuria.    Imaging was not performed today because stone was extracted intact and patient is asymptomatic.     Stone composition was 100% calcium oxalate.     PLAN    70 yo M with hx of remote stone disease s/p left ureteroscopic extraction of obstructing UVJ stone, no postoperative imaging. Tiny right renal stone, asymptomatic.    He is a low risk remotely recurrent stone former and was educated on conservative strategies for risk reduction. He will follow up on a prn basis.    Patient also seen and examined by Natasha Regan PA-C       ROS   Review of systems is negative except for HPI.    Past Medical History:   Diagnosis Date     Kidney stone      Myocardial infarction (H)      Ureterolithiasis        Past Surgical History:   Procedure Laterality Date     CORONARY ANGIOPLASTY WITH STENT PLACEMENT       CYSTOSCOPY W/ URETERAL STENT PLACEMENT Left 7/12/2019    Procedure: CYSTOSCOPY LEFT URETEROSCOPIC STONE EXTRACTION STENT INSERTION;  Surgeon: Brooks Sanchez MD;  Location: [facility];  Service: Urology       Current Outpatient Medications   Medication Sig Dispense Refill     aspirin 81 MG EC tablet Take 81 mg by mouth.       atorvastatin (LIPITOR) 80 MG tablet Take 80 mg by mouth.       clopidogrel (PLAVIX) 75 mg tablet Take 75 mg by mouth daily.  3     ergocalciferol, vitamin D2, 400 unit Tab Take 400 Units by mouth.       losartan (COZAAR) 25 MG tablet TK 1/2 T PO D  3     multivitamin with minerals (SUPER THERA JULIAN M) tablet Take 1 tablet by mouth.       nitroglycerin (NITROSTAT) 0.4 MG SL tablet Place 0.4 mg under the tongue.       sildenafil (REVATIO) 20 mg tablet TK 1/2 TO 1 PRN ONE HOUR BEFORE INTERCOURSE. NO MORE THAN 1 DOSE IN 24 HOURS        tamsulosin (FLOMAX) 0.4 mg cap 1 tablet (0.4 mg) daily with food (Patient taking differently: 0.8 mg. 1 tablet (0.4 mg) daily with food      ) 30 capsule 2     No current facility-administered medications for this visit.        No Known Allergies    Social History     Socioeconomic History     Marital status:      Spouse name: Not on file     Number of children: Not on file     Years of education: Not on file     Highest education level: Not on file   Occupational History     Occupation: Contractor   Social Needs     Financial resource strain: Not on file     Food insecurity:     Worry: Not on file     Inability: Not on file     Transportation needs:     Medical: Not on file     Non-medical: Not on file   Tobacco Use     Smoking status: Former Smoker     Last attempt to quit: 2017     Years since quittin.0     Smokeless tobacco: Never Used   Substance and Sexual Activity     Alcohol use: Yes     Comment: occas     Drug use: Not on file     Sexual activity: Not on file   Lifestyle     Physical activity:     Days per week: Not on file     Minutes per session: Not on file     Stress: Not on file   Relationships     Social connections:     Talks on phone: Not on file     Gets together: Not on file     Attends Worship service: Not on file     Active member of club or organization: Not on file     Attends meetings of clubs or organizations: Not on file     Relationship status: Not on file     Intimate partner violence:     Fear of current or ex partner: Not on file     Emotionally abused: Not on file     Physically abused: Not on file     Forced sexual activity: Not on file   Other Topics Concern     Not on file   Social History Narrative     Not on file       Family History   Problem Relation Age of Onset     Diabetes Mother      Urolithiasis Father      Urolithiasis Sister      Diabetes Sister      Heart disease Sister      Prostate cancer Brother      Diabetes Maternal Grandmother      Clotting  disorder Neg Hx      Gout Neg Hx      Objective:      Physical Exam  Vitals:    08/12/19 0801   BP: 136/77   Pulse: 71   Temp: 97.6  F (36.4  C)     General - well developed, well nourished, appropriate for age. Appears no distress at this time  Abdomen - mildly obese soft, non-tender, no hepatosplenomegaly, no masses.   - no flank tenderness, no suprapubic tenderness, kidney and bladder non-palpable  MSK - normal spinal curvature. no spinal tenderness. normal gait. muscular strength intact.  Psych - oriented to time, place, and person, normal mood and affect.    Labs   Urinalysis POC (Office):  Nitrite, UA   Date Value Ref Range Status   08/12/2019 Negative Negative Final   07/18/2019 Negative Negative Final   07/13/2019 Negative Negative Final       Lab Urinalysis:  Blood, UA   Date Value Ref Range Status   08/12/2019 Large (!) Negative Final   07/18/2019 Moderate (!) Negative Final   07/13/2019 Large (!) Negative Final     Nitrite, UA   Date Value Ref Range Status   08/12/2019 Negative Negative Final   07/18/2019 Negative Negative Final   07/13/2019 Negative Negative Final     Leukocytes, UA   Date Value Ref Range Status   08/12/2019 Moderate (!) Negative Final   07/18/2019 Moderate (!) Negative Final   07/13/2019 Moderate (!) Negative Final     pH, UA   Date Value Ref Range Status   08/12/2019 5.5 5.0 - 8.0 Final   07/18/2019 5.5 4.5 - 8.0 Final   07/13/2019 6.5 4.5 - 8.0 Final

## 2021-06-03 VITALS — WEIGHT: 175 LBS | BODY MASS INDEX: 27.47 KG/M2 | HEIGHT: 67 IN

## 2021-06-16 PROBLEM — N20.0 CALCULUS OF KIDNEY: Status: ACTIVE | Noted: 2019-07-08

## 2021-07-03 NOTE — ANESTHESIA PREPROCEDURE EVALUATION
Anesthesia Preprocedure Evaluation by Narinder Foote MD at 7/12/2019  9:56 AM     Author: Narinder Foote MD Service: -- Author Type: Physician    Filed: 7/12/2019  9:58 AM Date of Service: 7/12/2019  9:56 AM Status: Signed    : Narinder Foote MD (Physician)       Anesthesia Evaluation      Patient summary reviewed   No history of anesthetic complications     Airway   Mallampati: II  Neck ROM: full   Pulmonary - normal exam   (+) a smoker (quit 2017)                         Cardiovascular - normal exam  Exercise tolerance: > or = 4 METS  (+) past MI, CAD, CABG/stent (s/p stent 2017), , hypercholesterolemia,     ECG reviewed        Neuro/Psych      Endo/Other - negative ROS      GI/Hepatic/Renal    (+)   chronic renal disease (nephrolithiasis),     Comments: diverticulitis          Dental                             Anesthesia Plan  Planned anesthetic: general LMA  Versed/fentanyl/propofol  Ketamine PRN  Decadron/zofran    ASA 3   Induction: intravenous   Anesthetic plan and risks discussed with: patient and spouse  Anesthesia plan special considerations: antiemetics,   Post-op plan: routine recovery      Results for orders placed or performed during the hospital encounter of 07/12/19   EKG 12 lead   Result Value Ref Range    SYSTOLIC BLOOD PRESSURE  mmHg    DIASTOLIC BLOOD PRESSURE  mmHg    VENTRICULAR RATE 67 BPM    ATRIAL RATE 67 BPM    P-R INTERVAL 234 ms    QRS DURATION 86 ms    Q-T INTERVAL 408 ms    QTC CALCULATION (BEZET) 431 ms    P Axis 43 degrees    R AXIS 14 degrees    T AXIS 47 degrees    MUSE DIAGNOSIS       Sinus rhythm with sinus arrhythmia with 1st degree A-V block  Otherwise normal ECG  No previous ECGs available         Chemistry        Component Value Date/Time     07/08/2019 0409    K 4.2 07/08/2019 0409     07/08/2019 0409    CO2 23 07/08/2019 0409    BUN 34 (H) 07/08/2019 0409    CREATININE 1.58 (H) 07/08/2019 0409     07/08/2019 0409        Component Value Date/Time     CALCIUM 9.6 07/08/2019 0409        Lab Results   Component Value Date    WBC 12.1 (H) 07/08/2019    HGB 14.7 07/08/2019    HCT 43.9 07/08/2019    MCV 92 07/08/2019     07/08/2019